# Patient Record
Sex: MALE | Race: ASIAN | NOT HISPANIC OR LATINO | Employment: OTHER | URBAN - METROPOLITAN AREA
[De-identification: names, ages, dates, MRNs, and addresses within clinical notes are randomized per-mention and may not be internally consistent; named-entity substitution may affect disease eponyms.]

---

## 2018-08-28 ENCOUNTER — HOSPITAL ENCOUNTER (OUTPATIENT)
Facility: HOSPITAL | Age: 78
Discharge: HOME | End: 2018-08-29
Attending: EMERGENCY MEDICINE | Admitting: FAMILY MEDICINE

## 2018-08-28 DIAGNOSIS — R53.1 GENERALIZED WEAKNESS: Primary | ICD-10-CM

## 2018-08-28 DIAGNOSIS — R07.9 CHEST PAIN: ICD-10-CM

## 2018-08-28 DIAGNOSIS — D72.829 LEUKOCYTOSIS, UNSPECIFIED TYPE: ICD-10-CM

## 2018-08-28 PROBLEM — E11.9 TYPE 2 DIABETES MELLITUS, WITH LONG-TERM CURRENT USE OF INSULIN: Status: ACTIVE | Noted: 2018-08-28

## 2018-08-28 PROBLEM — N18.30 CKD (CHRONIC KIDNEY DISEASE) STAGE 3, GFR 30-59 ML/MIN: Status: ACTIVE | Noted: 2018-08-28

## 2018-08-28 PROBLEM — Z79.4 TYPE 2 DIABETES MELLITUS, WITH LONG-TERM CURRENT USE OF INSULIN: Status: ACTIVE | Noted: 2018-08-28

## 2018-08-28 PROBLEM — E86.1 INTRAVASCULAR VOLUME DEPLETION: Status: ACTIVE | Noted: 2018-08-28

## 2018-08-28 LAB
ALBUMIN SERPL BCP-MCNC: 3.2 G/DL
ALP SERPL-CCNC: 183 U/L
ALT SERPL W/O P-5'-P-CCNC: 75 U/L
ANION GAP SERPL CALC-SCNC: 17 MMOL/L
AST SERPL-CCNC: 86 U/L
BACTERIA #/AREA URNS HPF: NORMAL /HPF
BASOPHILS # BLD AUTO: ABNORMAL K/UL
BASOPHILS NFR BLD: 0 %
BILIRUB SERPL-MCNC: 0.9 MG/DL
BILIRUB UR QL STRIP: NEGATIVE
BUN SERPL-MCNC: 27 MG/DL
CALCIUM SERPL-MCNC: 9.5 MG/DL
CHLORIDE SERPL-SCNC: 94 MMOL/L
CLARITY UR: CLEAR
CO2 SERPL-SCNC: 22 MMOL/L
COLOR UR: YELLOW
CREAT SERPL-MCNC: 1.6 MG/DL
DIFFERENTIAL METHOD: ABNORMAL
EOSINOPHIL # BLD AUTO: ABNORMAL K/UL
EOSINOPHIL NFR BLD: 0 %
ERYTHROCYTE [DISTWIDTH] IN BLOOD BY AUTOMATED COUNT: 13.1 %
EST. GFR  (AFRICAN AMERICAN): 47 ML/MIN/1.73 M^2
EST. GFR  (NON AFRICAN AMERICAN): 41 ML/MIN/1.73 M^2
ESTIMATED AVG GLUCOSE: 223 MG/DL
GIANT PLATELETS BLD QL SMEAR: PRESENT
GLUCOSE SERPL-MCNC: 386 MG/DL
GLUCOSE UR QL STRIP: ABNORMAL
HBA1C MFR BLD HPLC: 9.4 %
HCT VFR BLD AUTO: 39.9 %
HGB BLD-MCNC: 13.8 G/DL
HGB UR QL STRIP: NEGATIVE
KETONES UR QL STRIP: NEGATIVE
LEUKOCYTE ESTERASE UR QL STRIP: NEGATIVE
LIPASE SERPL-CCNC: 62 U/L
LYMPHOCYTES # BLD AUTO: ABNORMAL K/UL
LYMPHOCYTES NFR BLD: 17 %
MCH RBC QN AUTO: 28.8 PG
MCHC RBC AUTO-ENTMCNC: 34.6 G/DL
MCV RBC AUTO: 83 FL
MICROSCOPIC COMMENT: NORMAL
MONOCYTES # BLD AUTO: ABNORMAL K/UL
MONOCYTES NFR BLD: 7 %
MYELOCYTES NFR BLD MANUAL: 2 %
NEUTROPHILS NFR BLD: 73 %
NEUTS BAND NFR BLD MANUAL: 1 %
NITRITE UR QL STRIP: NEGATIVE
PH UR STRIP: 6 [PH] (ref 5–8)
PLATELET # BLD AUTO: 467 K/UL
PMV BLD AUTO: 9.5 FL
POCT GLUCOSE: 181 MG/DL (ref 70–110)
POCT GLUCOSE: 356 MG/DL (ref 70–110)
POTASSIUM SERPL-SCNC: 4.2 MMOL/L
PROCALCITONIN SERPL IA-MCNC: 1.05 NG/ML
PROT SERPL-MCNC: 8 G/DL
PROT UR QL STRIP: ABNORMAL
RBC # BLD AUTO: 4.79 M/UL
RBC #/AREA URNS HPF: 0 /HPF (ref 0–4)
SODIUM SERPL-SCNC: 133 MMOL/L
SP GR UR STRIP: 1.01 (ref 1–1.03)
SQUAMOUS #/AREA URNS HPF: 0 /HPF
TROPONIN I SERPL DL<=0.01 NG/ML-MCNC: <0.006 NG/ML
URN SPEC COLLECT METH UR: ABNORMAL
UROBILINOGEN UR STRIP-ACNC: NEGATIVE EU/DL
WBC # BLD AUTO: 19.27 K/UL
WBC #/AREA URNS HPF: 1 /HPF (ref 0–5)
WBC CLUMPS URNS QL MICRO: NORMAL
YEAST URNS QL MICRO: NORMAL

## 2018-08-28 PROCEDURE — G0378 HOSPITAL OBSERVATION PER HR: HCPCS

## 2018-08-28 PROCEDURE — 96360 HYDRATION IV INFUSION INIT: CPT

## 2018-08-28 PROCEDURE — 25000003 PHARM REV CODE 250: Performed by: STUDENT IN AN ORGANIZED HEALTH CARE EDUCATION/TRAINING PROGRAM

## 2018-08-28 PROCEDURE — 93010 ELECTROCARDIOGRAM REPORT: CPT | Mod: ,,, | Performed by: INTERNAL MEDICINE

## 2018-08-28 PROCEDURE — 36415 COLL VENOUS BLD VENIPUNCTURE: CPT

## 2018-08-28 PROCEDURE — 84145 PROCALCITONIN (PCT): CPT

## 2018-08-28 PROCEDURE — 85007 BL SMEAR W/DIFF WBC COUNT: CPT

## 2018-08-28 PROCEDURE — 83690 ASSAY OF LIPASE: CPT

## 2018-08-28 PROCEDURE — 87040 BLOOD CULTURE FOR BACTERIA: CPT | Mod: 59

## 2018-08-28 PROCEDURE — 63600175 PHARM REV CODE 636 W HCPCS: Performed by: STUDENT IN AN ORGANIZED HEALTH CARE EDUCATION/TRAINING PROGRAM

## 2018-08-28 PROCEDURE — 85027 COMPLETE CBC AUTOMATED: CPT

## 2018-08-28 PROCEDURE — 94761 N-INVAS EAR/PLS OXIMETRY MLT: CPT

## 2018-08-28 PROCEDURE — 80053 COMPREHEN METABOLIC PANEL: CPT

## 2018-08-28 PROCEDURE — 25000003 PHARM REV CODE 250: Performed by: EMERGENCY MEDICINE

## 2018-08-28 PROCEDURE — 84484 ASSAY OF TROPONIN QUANT: CPT

## 2018-08-28 PROCEDURE — 99285 EMERGENCY DEPT VISIT HI MDM: CPT | Mod: 25

## 2018-08-28 PROCEDURE — 82962 GLUCOSE BLOOD TEST: CPT | Mod: 59

## 2018-08-28 PROCEDURE — 93005 ELECTROCARDIOGRAM TRACING: CPT

## 2018-08-28 PROCEDURE — 81000 URINALYSIS NONAUTO W/SCOPE: CPT

## 2018-08-28 PROCEDURE — 83036 HEMOGLOBIN GLYCOSYLATED A1C: CPT

## 2018-08-28 RX ORDER — SODIUM CHLORIDE 9 MG/ML
INJECTION, SOLUTION INTRAVENOUS CONTINUOUS
Status: ACTIVE | OUTPATIENT
Start: 2018-08-28 | End: 2018-08-29

## 2018-08-28 RX ORDER — ACETAMINOPHEN 325 MG/1
650 TABLET ORAL EVERY 4 HOURS PRN
Status: DISCONTINUED | OUTPATIENT
Start: 2018-08-28 | End: 2018-08-29 | Stop reason: HOSPADM

## 2018-08-28 RX ORDER — AZITHROMYCIN 250 MG/1
250 TABLET, FILM COATED ORAL DAILY
Status: ON HOLD | COMMUNITY
End: 2018-08-29 | Stop reason: HOSPADM

## 2018-08-28 RX ORDER — SODIUM CHLORIDE 0.9 % (FLUSH) 0.9 %
5 SYRINGE (ML) INJECTION
Status: DISCONTINUED | OUTPATIENT
Start: 2018-08-28 | End: 2018-08-29 | Stop reason: HOSPADM

## 2018-08-28 RX ORDER — INSULIN ASPART 100 [IU]/ML
1-10 INJECTION, SOLUTION INTRAVENOUS; SUBCUTANEOUS
Status: DISCONTINUED | OUTPATIENT
Start: 2018-08-28 | End: 2018-08-29 | Stop reason: HOSPADM

## 2018-08-28 RX ORDER — ONDANSETRON 2 MG/ML
4 INJECTION INTRAMUSCULAR; INTRAVENOUS EVERY 6 HOURS PRN
Status: DISCONTINUED | OUTPATIENT
Start: 2018-08-28 | End: 2018-08-29 | Stop reason: HOSPADM

## 2018-08-28 RX ORDER — HEPARIN SODIUM 5000 [USP'U]/ML
5000 INJECTION, SOLUTION INTRAVENOUS; SUBCUTANEOUS EVERY 8 HOURS
Status: DISCONTINUED | OUTPATIENT
Start: 2018-08-28 | End: 2018-08-29 | Stop reason: HOSPADM

## 2018-08-28 RX ORDER — IBUPROFEN 200 MG
24 TABLET ORAL
Status: DISCONTINUED | OUTPATIENT
Start: 2018-08-28 | End: 2018-08-29 | Stop reason: HOSPADM

## 2018-08-28 RX ORDER — GLUCAGON 1 MG
1 KIT INJECTION
Status: DISCONTINUED | OUTPATIENT
Start: 2018-08-28 | End: 2018-08-29 | Stop reason: HOSPADM

## 2018-08-28 RX ORDER — SODIUM CHLORIDE 9 MG/ML
500 INJECTION, SOLUTION INTRAVENOUS
Status: COMPLETED | OUTPATIENT
Start: 2018-08-28 | End: 2018-08-28

## 2018-08-28 RX ORDER — IBUPROFEN 200 MG
16 TABLET ORAL
Status: DISCONTINUED | OUTPATIENT
Start: 2018-08-28 | End: 2018-08-29 | Stop reason: HOSPADM

## 2018-08-28 RX ADMIN — SODIUM CHLORIDE 500 ML: 0.9 INJECTION, SOLUTION INTRAVENOUS at 04:08

## 2018-08-28 RX ADMIN — SODIUM CHLORIDE: 0.9 INJECTION, SOLUTION INTRAVENOUS at 09:08

## 2018-08-28 RX ADMIN — SODIUM CHLORIDE 500 ML: 0.9 INJECTION, SOLUTION INTRAVENOUS at 02:08

## 2018-08-28 RX ADMIN — HEPARIN SODIUM 5000 UNITS: 5000 INJECTION, SOLUTION INTRAVENOUS; SUBCUTANEOUS at 09:08

## 2018-08-28 NOTE — ED PROVIDER NOTES
Encounter Date: 8/28/2018    SCRIBE #1 NOTE: I, Ang Johnson, am scribing for, and in the presence of,  Dr. Santos Orozco. I have scribed the entire note.       History     Chief Complaint   Patient presents with    Fatigue     pt c/o generalized weakness onset 4 days ago reports flucuation of glucose from 100-400. started taking novolg yesterday per PCP glucose today [id number] this am      Thao Moss is a 78 y.o. male who  has no past medical history on file.    Patient presents to the ED due to fatigue secondary to glucose fluctuation. Yesterday he was placed on Novalog insulin instead of PO insulin, which he had been taking for the past 15 years. He has been having trouble standing and walking, is very fatigued. Pt is usually ambulatory and active. Denies CP or SOB. Denies any pain associated with weakness. Mental status is normal as reported by family, no slurred speech, fully awake and alert. No sick contact, fever, chills, nausea, vomiting, diarrhea, dysuria. No recent travel. No hx CVA. Hx of cholecystectomy.         The history is provided by a relative. A  was used (Son, Chinese).     Review of patient's allergies indicates:  No Known Allergies  History reviewed. No pertinent past medical history.  History reviewed. No pertinent surgical history.  History reviewed. No pertinent family history.  Social History     Tobacco Use    Smoking status: Former Smoker    Smokeless tobacco: Never Used   Substance Use Topics    Alcohol use: No     Frequency: Never    Drug use: No     Review of Systems   Constitutional: Positive for fatigue.   Neurological: Positive for weakness.   All other systems reviewed and are negative.      Physical Exam     Initial Vitals [08/28/18 1311]   BP Pulse Resp Temp SpO2   (!) 148/70 81 16 98.5 °F (36.9 °C) 95 %      MAP       --         Physical Exam    Nursing note and vitals reviewed.  Constitutional: He appears well-developed and well-nourished. No distress.    HENT:   Head: Normocephalic and atraumatic.   Eyes: Conjunctivae and EOM are normal.   Neck: Normal range of motion. Neck supple.   Cardiovascular: Normal rate, regular rhythm, normal heart sounds and intact distal pulses.   No murmur heard.  Pulmonary/Chest: Breath sounds normal. No respiratory distress.   Abdominal: Soft. Bowel sounds are normal. He exhibits no distension. There is no tenderness.   No hernias   Genitourinary: Penis normal.   Musculoskeletal: Normal range of motion. He exhibits no edema or tenderness.   Neurological: He is alert and oriented to person, place, and time. He has normal strength.   Skin: Skin is warm and dry.   Psychiatric: He has a normal mood and affect. His behavior is normal.         ED Course   Procedures  Labs Reviewed   CBC W/ AUTO DIFFERENTIAL - Abnormal; Notable for the following components:       Result Value    WBC 19.27 (*)     Hemoglobin 13.8 (*)     Hematocrit 39.9 (*)     Platelets 467 (*)     Lymph% 17.0 (*)     All other components within normal limits   COMPREHENSIVE METABOLIC PANEL - Abnormal; Notable for the following components:    Sodium 133 (*)     Chloride 94 (*)     CO2 22 (*)     Glucose 386 (*)     BUN, Bld 27 (*)     Creatinine 1.6 (*)     Albumin 3.2 (*)     Alkaline Phosphatase 183 (*)     AST 86 (*)     ALT 75 (*)     Anion Gap 17 (*)     eGFR if  47 (*)     eGFR if non  41 (*)     All other components within normal limits   URINALYSIS, REFLEX TO URINE CULTURE - Abnormal; Notable for the following components:    Protein, UA Trace (*)     Glucose, UA 3+ (*)     All other components within normal limits    Narrative:     Preferred Collection Type->Urine, Clean Catch   LIPASE - Abnormal; Notable for the following components:    Lipase 62 (*)     All other components within normal limits   PROCALCITONIN - Abnormal; Notable for the following components:    Procalcitonin 1.05 (*)     All other components within normal limits    POCT GLUCOSE - Abnormal; Notable for the following components:    POCT Glucose 356 (*)     All other components within normal limits   POCT GLUCOSE - Abnormal; Notable for the following components:    POCT Glucose 181 (*)     All other components within normal limits   TROPONIN I   URINALYSIS MICROSCOPIC    Narrative:     Preferred Collection Type->Urine, Clean Catch   LIPASE   PROCALCITONIN   POCT GLUCOSE     EKG Readings: (Independently Interpreted)   HR 68  NSR  nonspecific T-wave abnormality  Normal intervals       Imaging Results          CT Head Without Contrast (Final result)  Result time 08/28/18 16:21:27    Final result by Roman Crystal MD (08/28/18 16:21:27)                 Impression:      No acute large vascular territory infarct or intracranial hemorrhage identified.  Further evaluation/follow-up as warranted.    Age-appropriate senescent changes as above.      Electronically signed by: Roman Crystal MD  Date:    08/28/2018  Time:    16:21             Narrative:    EXAMINATION:  CT HEAD WITHOUT CONTRAST    CLINICAL HISTORY:  weakness;    TECHNIQUE:  Low dose axial CT images obtained throughout the head without intravenous contrast. Sagittal and coronal reconstructions were performed.    COMPARISON:  None.    FINDINGS:  Intracranial compartment: Age-appropriate mild generalized cerebral volume loss.    Ventricles and sulci are normal in size for age without evidence of hydrocephalus. No extra-axial blood or fluid collections.    Brain is normal in morphology.  Supratentorial periventricular and subcortical white matter mild to moderate nonspecific low-attenuation changes, likely sequela of chronic small vessel ischemic disease.  No parenchymal mass, hemorrhage, edema or major vascular distribution infarct.    Skull/extracranial contents (limited evaluation): No fracture. Mastoid air cells and paranasal sinuses are essentially clear.                               X-Ray Chest AP Portable (Final result)   Result time 08/28/18 15:02:51    Final result by Roman Crystal MD (08/28/18 15:02:51)                 Impression:      No acute abnormality.      Electronically signed by: Roman Crystal MD  Date:    08/28/2018  Time:    15:02             Narrative:    EXAMINATION:  XR CHEST AP PORTABLE    CLINICAL HISTORY:  Chest Pain;    TECHNIQUE:  Single frontal view of the chest was performed.    COMPARISON:  None    FINDINGS:  Monitoring leads overlie the chest.The lungs are clear, with normal appearance of pulmonary vasculature and no pleural effusion or pneumothorax.    The cardiac silhouette is normal in size. The hilar and mediastinal contours are unremarkable.    Bones are intact.  PA and lateral views can be obtained.                                 Medical Decision Making:   Initial Assessment:   Patient is a 78 y.o. male presenting to ED with generalized weakness and fatigue for the last day. He was recently switched from PO insulin to Novolog.   Exam benign. Strength and neuro intact, no abd tenderness, no LE swelling.  Plan to obtain septic workup.  Will continue to monitor closely and reassess.                        Clinical Impression:   The primary encounter diagnosis was Generalized weakness. Diagnoses of Leukocytosis, unspecified type were also pertinent to this visit.            I, Dr. Santos Orozco, personally performed the services described in this documentation. All medical record entries made by the scribe were at my direction and in my presence.  I have reviewed the chart and agree that the record reflects my personal performance and is accurate and complete. Santos Orozco MD.  2:21 PM                   Santos Orozco MD  08/29/18 7711

## 2018-08-28 NOTE — H&P
"[facility]  History & Physical    Subjective:      Chief Complaint/Reason for Admission: Weakness    Thao Moss is a 78 y.o. male with PMHx of Diabetes that presents to the ED with weakness of 1 days duration. His son gave the patient's history as the patient only speaks Mandarin. The son states his father was changed from oral hyperglycemic medications to injectable insulin yesterday and since then he has been weak and tired. He is normally active, but he has been not wanting to get out of bed or walk since starting the insulin. The patient was started on a short acting sliding scale insulin and took 8 units at dinner and 8 units before bed. This AM his BS was 140, which then increased to 280 after breakfast and then 380 in the ED. He states his father has been eating ok, but only drinks 1-2 bottles of water a day. He also states his father has not been sleeping well lately. He had fever 1 week ago and has intermitted dry cough. He denies any pain. Of note, the patient only has one kidney s/p nephrectomy in [place] for a "spot" on his kidney. Family history is unknown, as are allergies. He quit smoking 20 years ago after smoking 2ppd for approximately 30 years. He also stopped drinking alcohol approximately 20 years ago.      In the ED, his BS was found to be 386. His CT head was negative. On labs, he was found to have a left shift with leukocytosis to 19.5. He has not taken any steroids. His EKG and trop were normal. His Cr was 1.6.       Social History     Tobacco Use    Smoking status: Former Smoker    Smokeless tobacco: Never Used   Substance Use Topics    Alcohol use: No     Frequency: Never    Drug use: No       (Not in a hospital admission)  Review of patient's allergies indicates:  No Known Allergies     Review of Systems   Constitutional: Positive for fever (1 week ago). Negative for chills and diaphoresis.   HENT: Negative for hearing loss and sore throat.    Respiratory: Positive " for cough (chronic intermittent dry). Negative for shortness of breath.    Cardiovascular: Negative for chest pain and leg swelling.   Gastrointestinal: Negative for constipation, diarrhea, nausea and vomiting.   Genitourinary: Negative for dysuria.   Musculoskeletal: Negative for back pain, falls, joint pain and neck pain.   Neurological: Positive for weakness. Negative for dizziness and speech change.       Objective:      Vital Signs (Most Recent)  Temp: 98.4 °F (36.9 °C) (08/28/18 1534)  Pulse: 67 (08/28/18 1630)  Resp: 16 (08/28/18 1630)  BP: (!) 156/74 (08/28/18 1630)  SpO2: 97 % (08/28/18 1630)    Vital Signs Range (Last 24H):  Temp:  [98.4 °F (36.9 °C)-98.5 °F (36.9 °C)]   Pulse:  [67-81]   Resp:  [16-35]   BP: (146-156)/(70-74)   SpO2:  [95 %-98 %]     Physical Exam   Constitutional: He appears well-developed and well-nourished. No distress.   HENT:   Head: Normocephalic and atraumatic.   Eyes: Conjunctivae and EOM are normal.   Neck: Neck supple. No JVD present.   Cardiovascular: Normal rate, regular rhythm, normal heart sounds and intact distal pulses.   Pulmonary/Chest: Effort normal and breath sounds normal.   Abdominal: Soft. Bowel sounds are normal. He exhibits distension.   Musculoskeletal: He exhibits no edema or deformity.   Neurological: He is alert.   Skin: Skin is warm and dry. He is not diaphoretic.   Psychiatric: He has a normal mood and affect. His behavior is normal.       Data Review:    CBC:   Lab Results   Component Value Date    WBC 19.27 (H) 08/28/2018    RBC 4.79 08/28/2018    HGB 13.8 (L) 08/28/2018    HCT 39.9 (L) 08/28/2018     (H) 08/28/2018     BMP:   Lab Results   Component Value Date     (H) 08/28/2018     (L) 08/28/2018    K 4.2 08/28/2018    CL 94 (L) 08/28/2018    CO2 22 (L) 08/28/2018    BUN 27 (H) 08/28/2018    CREATININE 1.6 (H) 08/28/2018    CALCIUM 9.5 08/28/2018     Imaging Results          CT Head Without Contrast (Final result)  Result time  08/28/18 16:21:27    Final result by Roman Crystal MD (08/28/18 16:21:27)                 Impression:      No acute large vascular territory infarct or intracranial hemorrhage identified.  Further evaluation/follow-up as warranted.    Age-appropriate senescent changes as above.      Electronically signed by: Roman Crystal MD  Date:    08/28/2018  Time:    16:21             Narrative:    EXAMINATION:  CT HEAD WITHOUT CONTRAST    CLINICAL HISTORY:  weakness;    TECHNIQUE:  Low dose axial CT images obtained throughout the head without intravenous contrast. Sagittal and coronal reconstructions were performed.    COMPARISON:  None.    FINDINGS:  Intracranial compartment: Age-appropriate mild generalized cerebral volume loss.    Ventricles and sulci are normal in size for age without evidence of hydrocephalus. No extra-axial blood or fluid collections.    Brain is normal in morphology.  Supratentorial periventricular and subcortical white matter mild to moderate nonspecific low-attenuation changes, likely sequela of chronic small vessel ischemic disease.  No parenchymal mass, hemorrhage, edema or major vascular distribution infarct.    Skull/extracranial contents (limited evaluation): No fracture. Mastoid air cells and paranasal sinuses are essentially clear.                               X-Ray Chest AP Portable (Final result)  Result time 08/28/18 15:02:51    Final result by Roman Crystal MD (08/28/18 15:02:51)                 Impression:      No acute abnormality.      Electronically signed by: Roman Crystal MD  Date:    08/28/2018  Time:    15:02             Narrative:    EXAMINATION:  XR CHEST AP PORTABLE    CLINICAL HISTORY:  Chest Pain;    TECHNIQUE:  Single frontal view of the chest was performed.    COMPARISON:  None    FINDINGS:  Monitoring leads overlie the chest.The lungs are clear, with normal appearance of pulmonary vasculature and no pleural effusion or pneumothorax.    The cardiac silhouette is normal in  size. The hilar and mediastinal contours are unremarkable.    Bones are intact.  PA and lateral views can be obtained.                                Assessment:      Active Hospital Problems    Diagnosis  POA    *Intravascular volume depletion [E86.1]  Yes    Type 2 diabetes mellitus, with long-term current use of insulin [E11.9, Z79.4]  Not Applicable    Weakness [R53.1]  Yes    Leukocytosis [D72.829]  Yes    CKD (chronic kidney disease) stage 3, GFR 30-59 ml/min [N18.3]  Yes    Generalized weakness [R53.1]  Yes      Resolved Hospital Problems   No resolved problems to display.       Plan:    Thao Moss is a 78 y.o. male with PMHx of Diabetes that presents to the ED with weakness of 1 days duration.     Intravascular Volume Depletion  Weakness  -Received 1L bolus in the ED  -Maintenance fluids at 75ml/hr  -History of CKD Stage  -Cr on admission: 1.6   -Baseline unknown  -Patient with 1 kidney, take extra caution to avoid nephrotoxic medications and contrast  -Monitor Cr    DM with CKD III  -Home Regimen: SSI with aspart  -Repeat A1c  -Diabetes Education   -Counseled on importance of BS control, including medication compliance and diet.   -Based on insulin usage during hospital stay, tailor discharge insulin  -Patient with only 1 kidney    Leukocytosis  -WBC 19.5  -Recent history of fever 1 week ago  -Intermittent cough  -Otherwise no signs of infection  -Blood cultures collected  -Low threshold for starting antibiotics    PT/OT: ordered  Diet: Diabetic  VTE: heparin 5k units    Dispo: pending improvement in weakness and blood sugars    Discussed with staff    Tyesha Pretty MD  PGY-1  \[facility]\"" Family Medicine  08/28/2018

## 2018-08-28 NOTE — ED NOTES
Pt resting comfortably in bed. Only c/o weakness at this time. Pt denies any pain or any other symptoms.

## 2018-08-28 NOTE — ED NOTES
Pt sleeping upon arrival into room, family at bedside. Pt denies any needs. Denies any distress at this time.

## 2018-08-28 NOTE — ED NOTES
Pt was sent here by his doctor. Pt is from [place], here visiting. 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 Recently blood glucose has been elevated and his doctor changes his medicine. 2 days ago,pt had labwork and today his doctor sent him here. Pt denies pain anywhere. states his legs are weak. denies bowel or bladder changes.  Skin WDL. resp are regular and unlabored. Tele shows SR.

## 2018-08-29 VITALS
HEIGHT: 64 IN | RESPIRATION RATE: 18 BRPM | SYSTOLIC BLOOD PRESSURE: 148 MMHG | OXYGEN SATURATION: 96 % | DIASTOLIC BLOOD PRESSURE: 74 MMHG | TEMPERATURE: 99 F | HEART RATE: 68 BPM | BODY MASS INDEX: 26.08 KG/M2 | WEIGHT: 152.75 LBS

## 2018-08-29 LAB
ALBUMIN SERPL BCP-MCNC: 2.7 G/DL
ALP SERPL-CCNC: 150 U/L
ALT SERPL W/O P-5'-P-CCNC: 57 U/L
ANION GAP SERPL CALC-SCNC: 10 MMOL/L
ANISOCYTOSIS BLD QL SMEAR: SLIGHT
AST SERPL-CCNC: 49 U/L
BASOPHILS # BLD AUTO: 0.04 K/UL
BASOPHILS NFR BLD: 0.2 %
BILIRUB SERPL-MCNC: 0.9 MG/DL
BUN SERPL-MCNC: 21 MG/DL
CALCIUM SERPL-MCNC: 9.3 MG/DL
CHLORIDE SERPL-SCNC: 101 MMOL/L
CO2 SERPL-SCNC: 26 MMOL/L
CREAT SERPL-MCNC: 1.3 MG/DL
DIFFERENTIAL METHOD: ABNORMAL
EOSINOPHIL # BLD AUTO: 0.1 K/UL
EOSINOPHIL NFR BLD: 0.4 %
ERYTHROCYTE [DISTWIDTH] IN BLOOD BY AUTOMATED COUNT: 13.2 %
EST. GFR  (AFRICAN AMERICAN): >60 ML/MIN/1.73 M^2
EST. GFR  (NON AFRICAN AMERICAN): 53 ML/MIN/1.73 M^2
GLUCOSE SERPL-MCNC: 165 MG/DL
HCT VFR BLD AUTO: 36 %
HGB BLD-MCNC: 12.2 G/DL
LYMPHOCYTES # BLD AUTO: 2 K/UL
LYMPHOCYTES NFR BLD: 11.2 %
MAGNESIUM SERPL-MCNC: 1.1 MG/DL
MCH RBC QN AUTO: 28.2 PG
MCHC RBC AUTO-ENTMCNC: 33.9 G/DL
MCV RBC AUTO: 83 FL
MONOCYTES # BLD AUTO: 1.2 K/UL
MONOCYTES NFR BLD: 6.5 %
NEUTROPHILS # BLD AUTO: 13.2 K/UL
NEUTROPHILS NFR BLD: 72.8 %
PHOSPHATE SERPL-MCNC: 2.3 MG/DL
PLATELET # BLD AUTO: 498 K/UL
PLATELET BLD QL SMEAR: ABNORMAL
PMV BLD AUTO: 9.2 FL
POCT GLUCOSE: 178 MG/DL (ref 70–110)
POCT GLUCOSE: 212 MG/DL (ref 70–110)
POIKILOCYTOSIS BLD QL SMEAR: SLIGHT
POTASSIUM SERPL-SCNC: 3.6 MMOL/L
PROT SERPL-MCNC: 7.6 G/DL
RBC # BLD AUTO: 4.32 M/UL
SODIUM SERPL-SCNC: 137 MMOL/L
WBC # BLD AUTO: 18.1 K/UL

## 2018-08-29 PROCEDURE — 94761 N-INVAS EAR/PLS OXIMETRY MLT: CPT

## 2018-08-29 PROCEDURE — 63600175 PHARM REV CODE 636 W HCPCS: Performed by: STUDENT IN AN ORGANIZED HEALTH CARE EDUCATION/TRAINING PROGRAM

## 2018-08-29 PROCEDURE — 80053 COMPREHEN METABOLIC PANEL: CPT

## 2018-08-29 PROCEDURE — 85027 COMPLETE CBC AUTOMATED: CPT

## 2018-08-29 PROCEDURE — 84100 ASSAY OF PHOSPHORUS: CPT

## 2018-08-29 PROCEDURE — G8980 MOBILITY D/C STATUS: HCPCS | Mod: CJ

## 2018-08-29 PROCEDURE — 97161 PT EVAL LOW COMPLEX 20 MIN: CPT

## 2018-08-29 PROCEDURE — G0378 HOSPITAL OBSERVATION PER HR: HCPCS

## 2018-08-29 PROCEDURE — 85007 BL SMEAR W/DIFF WBC COUNT: CPT

## 2018-08-29 PROCEDURE — 97165 OT EVAL LOW COMPLEX 30 MIN: CPT

## 2018-08-29 PROCEDURE — 83735 ASSAY OF MAGNESIUM: CPT

## 2018-08-29 PROCEDURE — G8979 MOBILITY GOAL STATUS: HCPCS | Mod: CJ

## 2018-08-29 PROCEDURE — 25000003 PHARM REV CODE 250: Performed by: STUDENT IN AN ORGANIZED HEALTH CARE EDUCATION/TRAINING PROGRAM

## 2018-08-29 PROCEDURE — G8978 MOBILITY CURRENT STATUS: HCPCS | Mod: CJ

## 2018-08-29 PROCEDURE — 36415 COLL VENOUS BLD VENIPUNCTURE: CPT

## 2018-08-29 RX ORDER — SODIUM CHLORIDE 9 MG/ML
INJECTION, SOLUTION INTRAVENOUS CONTINUOUS
Status: DISCONTINUED | OUTPATIENT
Start: 2018-08-29 | End: 2018-08-29 | Stop reason: HOSPADM

## 2018-08-29 RX ORDER — LANOLIN ALCOHOL/MO/W.PET/CERES
400 CREAM (GRAM) TOPICAL ONCE
Status: COMPLETED | OUTPATIENT
Start: 2018-08-29 | End: 2018-08-29

## 2018-08-29 RX ADMIN — ACETAMINOPHEN 650 MG: 325 TABLET ORAL at 09:08

## 2018-08-29 RX ADMIN — POTASSIUM PHOSPHATE, MONOBASIC 500 MG: 500 TABLET, SOLUBLE ORAL at 09:08

## 2018-08-29 RX ADMIN — HEPARIN SODIUM 5000 UNITS: 5000 INJECTION, SOLUTION INTRAVENOUS; SUBCUTANEOUS at 05:08

## 2018-08-29 RX ADMIN — MAGNESIUM OXIDE TAB 400 MG (241.3 MG ELEMENTAL MG) 400 MG: 400 (241.3 MG) TAB at 09:08

## 2018-08-29 NOTE — PLAN OF CARE
See previous [place] note  Pt being discharged home with son; [place] and [facility] floor nurse reviewed AVS with pt and son; pt/son prefer to schedule own follow up. Son providing transport home.     08/29/18 1301   Final Note   Assessment Type Final Discharge Note   Discharge Disposition Home   What phone number can be called within the next 1-3 days to see how you are doing after discharge? [phone]  (Ponce Moss (son) )   Hospital Follow Up  Appt(s) scheduled? No  (pt PREFERS to scheddule himself)   Discharge plans and expectations educations in teach back method with documentation complete? Yes   Right Care Referral Info   Post Acute Recommendation No Care

## 2018-08-29 NOTE — PLAN OF CARE
Problem: Physical Therapy Goal  Goal: Physical Therapy Goal  Goals to be met by: 8/29/2018     No PT goals established        Outcome: Goals achieved  Patient Mod I to supervision for gait and all transitional movements  No acute skilled PT needs apparent  Will DC PT service.  Home

## 2018-08-29 NOTE — PLAN OF CARE
Problem: Patient Care Overview  Goal: Plan of Care Review  Outcome: Ongoing (interventions implemented as appropriate)  Patient arrived to unit at 2045. Patient's son translated admission questions. Stated no pain. Ambulates with stand-by assistance. Placed bed alarm on and fall risk band, nonskid socks. Started on NS 75 ml/hr. BS was 181 right before transport. No insulin given. NSR on telemetry and vital signs remained within normal parameters. IV intact and clean. Will continue to monitor.

## 2018-08-29 NOTE — PLAN OF CARE
Problem: Patient Care Overview  Goal: Plan of Care Review  Outcome: Ongoing (interventions implemented as appropriate)  Patient on RA with sats as documented.  No distress. Will continue to monitor.

## 2018-08-29 NOTE — PT/OT/SLP EVAL
Occupational Therapy   Evaluation and Discharge Note    Name: Thao Moss  MRN: [id number]  Admitting Diagnosis:  Intravascular volume depletion      Recommendations:     Discharge Recommendations: home  Discharge Equipment Recommendations:  none  Barriers to discharge:  None    History:     Occupational Profile:  Living Environment: Pt lives in [place] w/his wife; currently staying w/son.  No environmental concerns  Previous level of function: indep  Roles and Routines:   Equipment Owned:  none  Assistance upon Discharge: family    History reviewed. No pertinent past medical history.    History reviewed. No pertinent surgical history.    Subjective     Chief Complaint: bilateral ankle pain w/wt bearing  Patient/Family stated goals: decrease pain  Communicated with: nurse prior to session.  Son present and acted as   Pain/Comfort:  · Pain Rating 1: 0/10  · Location - Side 1: Bilateral  · Location 1: ankle  · Pain Addressed 1: Cessation of Activity, Nurse notified  · Pain Rating Post-Intervention 1: (no pain at rest; only with weight bearing which improved w/mobility)    Patients cultural, spiritual, [employer] conflicts given the current situation:      Objective:     Patient found with: peripheral IV, telemetry    General Precautions: Standard, fall   Orthopedic Precautions:    Braces:       Occupational Performance:    Bed Mobility:    · Patient completed Rolling/Turning to Left with  modified independence  · Patient completed Scooting/Bridging with modified independence  · Patient completed Supine to Sit with modified independence  · Patient completed Sit to Supine with modified independence    Functional Mobility/Transfers:  · Patient completed Sit <> Stand Transfer with modified independence and supervision  with  no assistive device   · Patient completed Toilet Transfer Step Transfer technique with modified independence and supervision with  no AD  · Functional Mobility: supervision-Mod I with and  "without RW  · Limited by c/o pain    Activities of Daily Living:  ·     Cognitive/Visual Perceptual:  AO4    Physical Exam:  BUE AROM/strength WFL, sensation grossly intact  Good sit balance, fair+ stand balance imp 2/2 pain    Patient left HOB elevated with all lines intact, call button in reach, bed alarm on, nurse notified and son present    [facility] 6 Click:  [facility] Total Score: 24    Treatment & Education:  Pt/son educated on recs for follow up for ankle pain, role of OT/POC  Education:    Assessment:     Thao oMss is a 78 y.o. male with a medical diagnosis of Intravascular volume depletion. At this time, patient is functioning at their prior level of function and does not require further acute OT services.     Clinical Decision Makin.  OT Low:  "Pt evaluation falls under low complexity for evaluation coding due to performance deficits noted in 1-3 areas as stated above and 0 co-morbities affecting current functional status. Data obtained from problem focused assessments. No modifications or assistance was required for completion of evaluation. Only brief occupational profile and history review completed."     Plan:     During this hospitalization, patient does not require further acute OT services.  Please re-consult if situation changes.    · Plan of Care Reviewed with: patient, son    This Plan of care has been discussed with the patient who was involved in its development and understands and is in agreement with the identified goals and treatment plan    GOALS:   Multidisciplinary Problems     Occupational Therapy Goals     Not on file          Multidisciplinary Problems (Resolved)        Problem: Occupational Therapy Goal    Goal Priority Disciplines Outcome Interventions   Occupational Therapy Goal   (Resolved)     OT, PT/OT Outcome(s) achieved                    Time Tracking:     OT Date of Treatment: 18  OT Start Time: 918  OT Stop Time: 940  OT Total Time (min): 22 min w/PT    Billable " Minutes:Evaluation 20    Raudel Castro OT  8/29/2018

## 2018-08-29 NOTE — DISCHARGE SUMMARY
"[facility]  Discharge Summary      Admit Date: 8/28/2018    Discharge Date and Time: 8/29/18    Attending Physician: Severyn Yaroshevsky, MD     Reason for Admission: Weakness secondary Intravascular Volume Depletion    Procedures Performed: * No surgery found *    HPI on 8/28 per Dr. Pretty:   Thao Moss is a 78 y.o. male with PMHx of Diabetes that presents to the ED with weakness of 1 days duration. His son gave the patient's history as the patient only speaks Mandarin. The son states his father was changed from oral hyperglycemic medications to injectable insulin yesterday and since then he has been weak and tired. He is normally active, but he has been not wanting to get out of bed or walk since starting the insulin. The patient was started on a short acting sliding scale insulin and took 8 units at dinner and 8 units before bed. This AM his BS was 140, which then increased to 280 after breakfast and then 380 in the ED. He states his father has been eating ok, but only drinks 1-2 bottles of water a day. He also states his father has not been sleeping well lately. He had fever 1 week ago and has intermitted dry cough. He denies any pain. Of note, the patient only has one kidney s/p nephrectomy in [place] for a "spot" on his kidney. Family history is unknown, as are allergies. He quit smoking 20 years ago after smoking 2ppd for approximately 30 years. He also stopped drinking alcohol approximately 20 years ago.       In the ED, his BS was found to be 386. His CT head was negative. On labs, he was found to have a left shift with leukocytosis to 19.5. He has not taken any steroids. His EKG and trop were normal. His Cr was 1.6.     Hospital Course   On 8/28, pt's son brought pt to ED for generalized weakness and dehydration. Pt speaks primarily Fujianese with little Mandarin, while son speaks proficient English but prefers Cantonese. While in ED, CT head was negative, but BS was 386 with a left " shift with leukocytosis.    Patient was then admitted to the hospital for generalized weakness, electrolyte imbalances, and dehydration. Pt was given mIVF.    On 8/29, pt appeared much better, and pt's American PCP Dr. Nathaniel Hicks was contacted for diabetic medication regimen recommendations.    Patient was discharged with follow-up with Dr. Hicks for a written letter in Chinese to Chinese physician before leaving for [place]. Pt's son and pt was educated on diabetic and dietary education regarding medication intake, and greater fluid intake was strongly encouraged.    Consults: none    Significant Diagnostic Studies:   CMP   Recent Labs   Lab  08/28/18   1433  08/29/18   0531   NA  133*  137   K  4.2  3.6   CL  94*  101   CO2  22*  26   GLU  386*  165*   BUN  27*  21   CREATININE  1.6*  1.3   CALCIUM  9.5  9.3   PROT  8.0  7.6   ALBUMIN  3.2*  2.7*   BILITOT  0.9  0.9   ALKPHOS  183*  150*   AST  86*  49*   ALT  75*  57*   ANIONGAP  17*  10   ESTGFRAFRICA  47*  >60   EGFRNONAA  41*  53*   , CBC   Recent Labs   Lab  08/28/18   1433  08/29/18   0531   WBC  19.27*  18.10*   HGB  13.8*  12.2*   HCT  39.9*  36.0*   PLT  467*  498*    and A1C:   Recent Labs   Lab  08/28/18   2102   HGBA1C  9.4*     Radiology:   Ultrasound: Right kidney: The right kidney measures 11.8 x 5.5 x 5.9 cm. No cortical thinning. No loss of corticomedullary distinction. Resistive index measures 0.72, slightly elevated. No mass. No renal stone. No hydronephrosis.    Left kidney: Nephrectomy, the surgical bed appears normal.    The bladder is partially distended at the time of scanning and has an unremarkable appearance.    Final Diagnoses:    Principal Problem: Intravascular volume depletion   Secondary Diagnoses:   Active Hospital Problems    Diagnosis  POA    *Intravascular volume depletion [E86.1]  Yes    Type 2 diabetes mellitus, with long-term current use of insulin [E11.9, Z79.4]  Not Applicable    Weakness [R53.1]  Yes    Leukocytosis [D72.829]   Yes    CKD (chronic kidney disease) stage 3, GFR 30-59 ml/min [N18.3]  Yes    Generalized weakness [R53.1]  Yes      Resolved Hospital Problems   No resolved problems to display.       Discharged Condition: stable    Disposition: Home or Self Care    Follow Up/Patient Instructions:     Medications:  Reconciled Home Medications:      Medication List      CONTINUE taking these medications    NOVOLOG FLEXPEN U-100 INSULIN SUBQ  Inject into the skin.        STOP taking these medications    ZITHROMAX 250 MG tablet  Generic drug:  azithromycin          Discharge Procedure Orders   Diet diabetic     Notify your health care provider if you experience any of the following:  temperature >100.4     Notify your health care provider if you experience any of the following:  persistent nausea and vomiting or diarrhea     Notify your health care provider if you experience any of the following:  severe uncontrolled pain     Notify your health care provider if you experience any of the following:  redness, tenderness, or signs of infection (pain, swelling, redness, odor or green/yellow discharge around incision site)     Notify your health care provider if you experience any of the following:  difficulty breathing or increased cough     Notify your health care provider if you experience any of the following:  severe persistent headache     Notify your health care provider if you experience any of the following:  worsening rash     Notify your health care provider if you experience any of the following:  persistent dizziness, light-headedness, or visual disturbances     Activity as tolerated     Follow-up Information     Nathaniel Hicks MD. Call in 1 week.    Specialty:  Family Medicine  Contact information:  [address] [facility]  Family Medicine & Acupuncture [facility] [address]  [phone]                  > 30 minutes spent on this discharge.     Latosha Hart DO

## 2018-08-29 NOTE — PLAN OF CARE
Pt speaks Mandarin; Son , Medardo at bedside and speaks/reads English. [place] offered to call [facility] free of charge but pt/son declined and on stated he has been interpreting for his father. Pt independent and has no d/c needs.     08/29/18 1258   Discharge Assessment   Assessment Type Discharge Planning Assessment   Confirmed/corrected address and phone number on facesheet? Yes   Assessment information obtained from? Patient;Caregiver  (son translated)   Expected Length of Stay (days) 1   Communicated expected length of stay with patient/caregiver yes   Prior to hospitilization cognitive status: Alert/Oriented   Prior to hospitalization functional status: Independent   Current cognitive status: Alert/Oriented   Current Functional Status: Independent   Lives With child(concetta), adult   Is patient able to care for self after discharge? Yes   Who are your caregiver(s) and their phone number(s)? Medardo Moss (son) [phone]   Patient's perception of discharge disposition home or selfcare   Readmission Within The Last 30 Days no previous admission in last 30 days   Patient currently being followed by outpatient case management? No   Patient currently receives any other outside agency services? No   Equipment Currently Used at Home none   Do you have any problems affording any of your prescribed medications? No   Is the patient taking medications as prescribed? yes   Does the patient have transportation home? Yes   Transportation Available family or friend will provide   Does the patient receive services at the [facility] Clinic? No   Discharge Plan A Home with family   Discharge Plan B Home with family   Patient/Family In Agreement With Plan yes

## 2018-08-29 NOTE — PLAN OF CARE
Problem: Occupational Therapy Goal  Goal: Occupational Therapy Goal  Outcome: Outcome(s) achieved Date Met: 08/29/18  OT eval performed, report to follow    No OT needs identified at this time.  Discharge OT

## 2018-08-29 NOTE — NURSING
Patient discharged home with son, Discharge instructions given to patient and son. Patient and son verbalized their complete understanding.Education given, refer to clinical reference for education. IV removed, tip intact. Telemetry removed. Waiting for transport.

## 2018-08-29 NOTE — PT/OT/SLP EVAL
Physical Therapy Evaluation Discharge    Patient Name:  Thao Moss   MRN:  [id number]    Recommendations:     Discharge Recommendations:  home   Discharge Equipment Recommendations: none   Barriers to discharge: None    Assessment:     Thao Moss is a 78 y.o. male admitted with a medical diagnosis of Intravascular volume depletion.  He presents with the following impairments/functional limitations:    Patient with bilateral ankle pain wt bearing no pain at rest, less pain with activity to tolerance..    Rehab Prognosis:  good; patient would benefit from acute skilled PT services to address these deficits and reach maximum level of function.      Recent Surgery: * No surgery found *      Plan:     During this hospitalization, patient to be seen   to address the above listed problems via    · Plan of Care Expires:  08/29/18   Plan of Care Reviewed with: patient, son    Subjective     Communicated with primary nurse prior to session.  Patient found supine upon PT entry to room, agreeable to evaluation.      Chief Complaint: pain with wt bearing   Patient comments/goals: go home  Pain/Comfort:  · Pain Rating 1: 4/10(with Wt bearing )  · Location - Side 1: Bilateral  · Location 1: ankle  · Pain Addressed 1: Cessation of Activity  · Pain Rating Post-Intervention 1: (no pain at rest)    Patients cultural, spiritual, Voodoo conflicts given the current situation:      Living Environment:  Live with spouse in [place] visiting son  Prior to admission, patients level of function was independent.  Patient has the following equipment: none.  DME owned (not currently used): none.  Upon discharge, patient will have assistance from family.    Objective:     Patient found with: telemetry     General Precautions: Standard, fall   Orthopedic Precautions:N/A   Braces: N/A     Exams:  · RLE ROM: WFL  · RLE Strength: WFL  · LLE ROM: WFL  · LLE Strength: WFL    Functional Mobility:  · Bed Mobility:     · Supine to Sit: modified  independence  · Sit to Supine: modified independence  · Transfers:     · Sit to Stand:  modified independence with no AD  · Gait: Mod I to supervision 2/2 pain ankles  · Balance: good     AM-PAC 6 CLICK MOBILITY  Total Score:23       Therapeutic Activities and Exercises:   na    Patient left supine with call button in reach and bed alarm on.    GOALS:   Multidisciplinary Problems     Physical Therapy Goals        Problem: Physical Therapy Goal    Goal Priority Disciplines Outcome Goal Variances Interventions   Physical Therapy Goal     PT, PT/OT Ongoing (interventions implemented as appropriate)     Description:  Goals to be met by: 8/29/2018     No PT goals established                          History:     History reviewed. No pertinent past medical history.    History reviewed. No pertinent surgical history.    Clinical Decision Making:     History  Co-morbidities and personal factors that may impact the plan of care Examination  Body Structures and Functions, activity limitations and participation restrictions that may impact the plan of care Clinical Presentation   Decision Making/ Complexity Score   Co-morbidities:   [] Time since onset of injury / illness / exacerbation  [] Status of current condition  []Patient's cognitive status and safety concerns    [] Multiple Medical Problems (see med hx)  Personal Factors:   [] Patient's age  [] Prior Level of function   [] Patient's home situation (environment and family support)  [] Patient's level of motivation  [] Expected progression of patient      HISTORY:(criteria)    [x] 90655 - no personal factors/history    [] 17325 - has 1-2 personal factor/comorbidity     [] 24658 - has >3 personal factor/comorbidity     Body Regions:  [] Objective examination findings  [] Head     []  Neck  [] Trunk   [] Upper Extremity  [x] Lower Extremity    Body Systems:  [] For communication ability, affect, cognition, language, and learning style: the assessment of the ability to make  needs known, consciousness, orientation (person, place, and time), expected emotional /behavioral responses, and learning preferences (eg, learning barriers, education  needs)  [] For the neuromuscular system: a general assessment of gross coordinated movement (eg, balance, gait, locomotion, transfers, and transitions) and motor function  (motor control and motor learning)  [x] For the musculoskeletal system: the assessment of gross symmetry, gross range of motion, gross strength, height, and weight  [] For the integumentary system: the assessment of pliability(texture), presence of scar formation, skin color, and skin integrity  [] For cardiovascular/pulmonary system: the assessment of heart rate, respiratory rate, blood pressure, and edema     Activity limitations:    [] Patient's cognitive status and saf ety concerns          [] Status of current condition      [] Weight bearing restriction  [] Cardiopulmunary Restriction    Participation Restrictions:   [] Goals and goal agreement with the patient     [] Rehab potential (prognosis) and probable outcome      Examination of Body System: (criteria)    [x] 89452 - addressing 1-2 elements    [] 67819 - addressing a total of 3 or more elements     [] 15296 -  Addressing a total of 4 or more elements         Clinical Presentation: (criteria)  Stable - 70603     On examination of body system using standardized tests and measures patient presents with 1-2 elements from any of the following: body structures and functions, activity limitations, and/or participation restrictions.  Leading to a clinical presentation that is considered stable and/or uncomplicated                              Clinical Decision Making  (Eval Complexity):  Low- 21441     Time Tracking:     PT Received On: 08/29/18  PT Start Time: 0918     PT Stop Time: 0938  PT Total Time (min): 20 min     Billable Minutes: Evaluation 20      Benjamin Watson, PT  08/29/2018

## 2018-08-29 NOTE — PROGRESS NOTES
PGY-3 Progress Note  [facility]  Follow up for: dehyrdation  Chief Complaint   Patient presents with    Fatigue     pt c/o generalized weakness onset 4 days ago reports flucuation of glucose from 100-400. started taking novolg yesterday per PCP glucose today [id number] this am        Hospital Stay Day 0      Subjective:   Pt AF VSS, except consistently hypertensive. Patient reports intermittence with UOP, but has regular BM and flatus (x2 since yesterday). Patient tolerating renal diabetic diet without n/v, and is ambulating with assistance. Patient reports 4/10 pain in ankles right > left this morning and requested for pain medication.    Pt's son reports pt has been in [place] the past year, traveling between his siblings. Pt was in [place] x2 weeks ago for ~4 months and was in [place] prior for ~4 months. Pt's son reports taking pt's BS = 400's yesterday morning and was told by a healthcare provider to present pt to the ED for evaluation. Pt's son states that pt has been diabetic for ~20 years and was recently changed from PO diabetic medication to insulin injections 3 days ago. Pt's son also reports pt was recently given antibiotics. Pt's son also reports pt has a h/o arthritis and intermittent decreased urinary stream. Pt's son states pt's diet consists of 3 bowls of rice/day and noodles in between, preserved vegetables out of a can, and mostly fish for protein when in [place]. Pt is set to return to [place] next week, stay for 2 weeks, and return home to [place].    Per pt's American PCP Dr. Hicks, pt was placed on Indapamide 2.5mg, Amlodipine 5mg, Glipizide 160mg total QD, and traditional Chinese herb mixture per physician in [place], who practices combined Western and Traditional Chinese Medicine (TCM). Dr. Hicks changed to Novolog sliding scale Aug 23, 2018.    Pt denies any CP, SOB, N/V/D, headaches, fever or chills.     History taken with Cantonese-speaking PA student's assistance. History given primarily by son  "with confirmation from patient in Chinese.    Patient speaks primarily Fujianese and limited Mandarin with heavy accent. Patient's son present and is proficient in English but prefers Cantonese. Pt's son is appropriate as interpretor. Pt states he prefers to be treated by male healthcare providers.    Scheduled Meds:   heparin (porcine)  5,000 Units Subcutaneous Q8H    potassium phosphate (monobasic)  500 mg Oral Daily     Continuous Infusions:   sodium chloride 0.9% 75 mL/hr at 18 2106     PRN Meds:acetaminophen, dextrose 50%, dextrose 50%, glucagon (human recombinant), glucose, glucose, insulin aspart U-100, ondansetron, sodium chloride 0.9%    Review of patient's allergies indicates:  No Known Allergies    Objectives:     Vitals(Most Recent)      BP  Min: 140/72  Max: 160/74  Temp  Av.6 °F (37 °C)  Min: 98.1 °F (36.7 °C)  Max: 99.1 °F (37.3 °C)  Pulse  Av.5  Min: 62  Max: 81  Resp  Av.1  Min: 15  Max: 35  SpO2  Av.5 %  Min: 95 %  Max: 98 %  Height  Av' 4" (162.6 cm)  Min: 5' 4" (162.6 cm)  Max: 5' 4" (162.6 cm)  Weight  Av.2 kg (156 lb 14.2 oz)  Min: 69.3 kg (152 lb 12.5 oz)  Max: 73 kg (161 lb)             Vitals(Pvvp90s)  Temp:  [98.1 °F (36.7 °C)-99.1 °F (37.3 °C)]   Pulse:  [62-81]   Resp:  [15-35]   BP: (140-160)/(64-74)   SpO2:  [95 %-98 %]     I & O([id number])    Intake/Output Summary (Last 24 hours) at 2018 0942  Last data filed at 2018 0545  Gross per 24 hour   Intake 1216.25 ml   Output 750 ml   Net 466.25 ml       Physical Exam:   Physical Exam   Constitutional: He is oriented to person, place, and time and well-developed, well-nourished, and in no distress. No distress.   HENT:   Head: Normocephalic and atraumatic.   Eyes: Conjunctivae and EOM are normal. Pupils are equal, round, and reactive to light. Right eye exhibits no discharge. Left eye exhibits no discharge.   Neck: Normal range of motion.   Cardiovascular: Normal rate, regular rhythm, normal heart " sounds and intact distal pulses.   No murmur heard.  Pulmonary/Chest: Effort normal and breath sounds normal. No tachypnea. No respiratory distress.   Intermittent coughing.   Abdominal: Soft. Bowel sounds are normal. He exhibits no distension and no mass. There is no tenderness. There is no rebound and no guarding.   Insulin injection sites noted to R of umbilicus.   Musculoskeletal: Normal range of motion. He exhibits tenderness. He exhibits no edema.   Point tenderness to R medial malleolus after ambulation to the restroom. No erythema noted to site.   Neurological: He is alert and oriented to person, place, and time.   Ambulating with assistance. Exhibits weakness and takes small steps.   Skin: Skin is warm and dry. He is not diaphoretic. No erythema.     LABS  CBC  Recent Labs   Lab  08/28/18 1433  08/29/18   0531   WBC  19.27*  18.10*   RBC  4.79  4.32*   HGB  13.8*  12.2*   HCT  39.9*  36.0*   PLT  467*  498*   MCV  83  83   MCH  28.8  28.2   MCHC  34.6  33.9       CMP  Recent Labs   Lab  08/28/18   1433  08/29/18   0531   NA  133*  137   K  4.2  3.6   CO2  22*  26   CL  94*  101   BUN  27*  21   CREATININE  1.6*  1.3   GLU  386*  165*     Recent Labs   Lab  08/28/18   1433  08/29/18   0531   CALCIUM  9.5  9.3   MG   --   1.1*   PHOS   --   2.3*     Recent Labs   Lab  08/28/18   1433  08/29/18   0531   PROT  8.0  7.6   ALBUMIN  3.2*  2.7*   BILITOT  0.9  0.9   AST  86*  49*   ALKPHOS  183*  150*   ALT  75*  57*   CE  Recent Labs   Lab  08/28/18   1433   TROPONINI  <0.006     LAST HbA1c  Lab Results   Component Value Date    HGBA1C 9.4 (H) 08/28/2018     Imaging  X-Ray Chest   No acute abnormality.    CT Head Without Contrast   No acute large vascular territory infarct or intracranial hemorrhage identified.  Intracranial compartment: Age-appropriate mild generalized cerebral volume loss.    US Retroperitoneal Complete   Right kidney: The right kidney measures 11.8 x 5.5 x 5.9 cm. No cortical thinning. No  loss of corticomedullary distinction. Resistive index measures 0.72, slightly elevated. No mass. No renal stone. No hydronephrosis.    Left kidney: Nephrectomy, the surgical bed appears normal.    The bladder is partially distended at the time of scanning and has an unremarkable appearance.    Micro:  Blood culture: prelim no growth.    Assessment/Plan:   Dehydration  -Received 1L bolus in the ED  -Maintenance fluids at 75ml/hr  -History of CKD  -Patient with 1 kidney, take extra caution to avoid nephrotoxic medications and contrast  -Cr 2.9 (8/23 at Dr. Hicks's office) -> 1.6 (8/28 on admission) --> 1.3 (today).  -PO Fluid intake highly encouraged.     DM 2 with CKD3  -Home Regimen: Novolog sliding scale since 8/23. SSI with aspart  -Repeat A1c. 9.4 on admission  -Diabetes and Diet Education   -Counseled on importance of BS control, including medication compliance and diet.   -Continue home Novolog upon discharge.  -Patient with only 1 kidney.  -Renal U/S: no stones, masses, or hydronephrosis.     Leukocytosis  -WBC 19.5, AF, no signs of acute infection  -Recent history of fever 1 week ago  -Intermittent cough  -Otherwise no signs of infection  -Blood cultures collected  -Patient finished outside antibiotic course recently     PT/OT: ordered  Diet: Renal Diabetic  VTE: heparin 5k units    Dispo: Stable to discharge.    Case d/w staff.     Latosha Hart D.O.  [facility] HO-3  08/29/2018

## 2018-09-02 LAB
BACTERIA BLD CULT: NORMAL
BACTERIA BLD CULT: NORMAL